# Patient Record
Sex: MALE | Race: WHITE | NOT HISPANIC OR LATINO | Employment: UNEMPLOYED | URBAN - NONMETROPOLITAN AREA
[De-identification: names, ages, dates, MRNs, and addresses within clinical notes are randomized per-mention and may not be internally consistent; named-entity substitution may affect disease eponyms.]

---

## 2020-02-18 ENCOUNTER — OFFICE VISIT (OUTPATIENT)
Dept: INTERNAL MEDICINE | Facility: CLINIC | Age: 40
End: 2020-02-18

## 2020-02-18 ENCOUNTER — LAB (OUTPATIENT)
Dept: LAB | Facility: HOSPITAL | Age: 40
End: 2020-02-18

## 2020-02-18 VITALS
DIASTOLIC BLOOD PRESSURE: 70 MMHG | SYSTOLIC BLOOD PRESSURE: 116 MMHG | OXYGEN SATURATION: 98 % | RESPIRATION RATE: 16 BRPM | BODY MASS INDEX: 22.89 KG/M2 | WEIGHT: 163.5 LBS | HEART RATE: 76 BPM | HEIGHT: 71 IN

## 2020-02-18 DIAGNOSIS — F17.210 CIGARETTE SMOKER: ICD-10-CM

## 2020-02-18 DIAGNOSIS — Z00.00 ENCOUNTER FOR PREVENTIVE HEALTH EXAMINATION: ICD-10-CM

## 2020-02-18 DIAGNOSIS — G56.23 ULNAR NEUROPATHY OF BOTH UPPER EXTREMITIES: ICD-10-CM

## 2020-02-18 DIAGNOSIS — F32.A ANXIETY AND DEPRESSION: ICD-10-CM

## 2020-02-18 DIAGNOSIS — F32.A ANXIETY AND DEPRESSION: Primary | ICD-10-CM

## 2020-02-18 DIAGNOSIS — G56.03 BILATERAL CARPAL TUNNEL SYNDROME: ICD-10-CM

## 2020-02-18 DIAGNOSIS — F41.9 ANXIETY AND DEPRESSION: Primary | ICD-10-CM

## 2020-02-18 DIAGNOSIS — Z86.19 HEPATITIS C VIRUS INFECTION CURED AFTER ANTIVIRAL DRUG THERAPY: ICD-10-CM

## 2020-02-18 DIAGNOSIS — F41.9 ANXIETY AND DEPRESSION: ICD-10-CM

## 2020-02-18 LAB
ALBUMIN SERPL-MCNC: 4.7 G/DL (ref 3.5–5.2)
ALBUMIN/GLOB SERPL: 1.6 G/DL
ALP SERPL-CCNC: 52 U/L (ref 39–117)
ALT SERPL W P-5'-P-CCNC: 7 U/L (ref 1–41)
ANION GAP SERPL CALCULATED.3IONS-SCNC: 10.2 MMOL/L (ref 5–15)
AST SERPL-CCNC: 17 U/L (ref 1–40)
BILIRUB SERPL-MCNC: 1.2 MG/DL (ref 0.2–1.2)
BUN BLD-MCNC: 14 MG/DL (ref 6–20)
BUN/CREAT SERPL: 15.4 (ref 7–25)
CALCIUM SPEC-SCNC: 9.4 MG/DL (ref 8.6–10.5)
CHLORIDE SERPL-SCNC: 101 MMOL/L (ref 98–107)
CHOLEST SERPL-MCNC: 153 MG/DL (ref 0–200)
CO2 SERPL-SCNC: 26.8 MMOL/L (ref 22–29)
CREAT BLD-MCNC: 0.91 MG/DL (ref 0.76–1.27)
GFR SERPL CREATININE-BSD FRML MDRD: 93 ML/MIN/1.73
GLOBULIN UR ELPH-MCNC: 2.9 GM/DL
GLUCOSE BLD-MCNC: 89 MG/DL (ref 65–99)
HBA1C MFR BLD: 5.58 % (ref 4.8–5.6)
HDLC SERPL-MCNC: 52 MG/DL (ref 40–60)
LDLC SERPL CALC-MCNC: 90 MG/DL (ref 0–100)
LDLC/HDLC SERPL: 1.73 {RATIO}
POTASSIUM BLD-SCNC: 4.5 MMOL/L (ref 3.5–5.2)
PROT SERPL-MCNC: 7.6 G/DL (ref 6–8.5)
SODIUM BLD-SCNC: 138 MMOL/L (ref 136–145)
TRIGL SERPL-MCNC: 56 MG/DL (ref 0–150)
TSH SERPL DL<=0.05 MIU/L-ACNC: 1.45 UIU/ML (ref 0.27–4.2)
VIT B12 BLD-MCNC: 373 PG/ML (ref 211–946)
VLDLC SERPL-MCNC: 11.2 MG/DL (ref 5–40)

## 2020-02-18 PROCEDURE — 80053 COMPREHEN METABOLIC PANEL: CPT | Performed by: INTERNAL MEDICINE

## 2020-02-18 PROCEDURE — 87522 HEPATITIS C REVRS TRNSCRPJ: CPT | Performed by: INTERNAL MEDICINE

## 2020-02-18 PROCEDURE — 82607 VITAMIN B-12: CPT | Performed by: INTERNAL MEDICINE

## 2020-02-18 PROCEDURE — 36415 COLL VENOUS BLD VENIPUNCTURE: CPT

## 2020-02-18 PROCEDURE — 90674 CCIIV4 VAC NO PRSV 0.5 ML IM: CPT | Performed by: INTERNAL MEDICINE

## 2020-02-18 PROCEDURE — 80061 LIPID PANEL: CPT | Performed by: INTERNAL MEDICINE

## 2020-02-18 PROCEDURE — 83036 HEMOGLOBIN GLYCOSYLATED A1C: CPT | Performed by: INTERNAL MEDICINE

## 2020-02-18 PROCEDURE — 84443 ASSAY THYROID STIM HORMONE: CPT | Performed by: INTERNAL MEDICINE

## 2020-02-18 PROCEDURE — 90471 IMMUNIZATION ADMIN: CPT | Performed by: INTERNAL MEDICINE

## 2020-02-18 PROCEDURE — 99204 OFFICE O/P NEW MOD 45 MIN: CPT | Performed by: INTERNAL MEDICINE

## 2020-02-18 RX ORDER — MULTIPLE VITAMINS W/ MINERALS TAB 9MG-400MCG
1 TAB ORAL DAILY
COMMUNITY

## 2020-02-18 RX ORDER — ESCITALOPRAM OXALATE 5 MG/1
5 TABLET ORAL DAILY
Qty: 30 TABLET | Refills: 2 | Status: SHIPPED | OUTPATIENT
Start: 2020-02-18

## 2020-02-18 RX ORDER — DEXTROAMPHETAMINE SACCHARATE, AMPHETAMINE ASPARTATE MONOHYDRATE, DEXTROAMPHETAMINE SULFATE AND AMPHETAMINE SULFATE 7.5; 7.5; 7.5; 7.5 MG/1; MG/1; MG/1; MG/1
30 CAPSULE, EXTENDED RELEASE ORAL EVERY MORNING
COMMUNITY
End: 2020-02-18

## 2020-02-18 NOTE — PROGRESS NOTES
CC: ARM PAIN     History:  Scotty Charles is a 40 y.o. male who presents today for evaluation of the above problems.  He notes he has been doing reasonably well, but has numbness and pain from his elbows to his fingertips that creates shooting sensation during the day along with numbness, but it is more painful and wakes him from sleep at night.  He was given braces for carpal tunnel, which have limited his awakening at night, but his numbness has not improved.  It affects both of his hands symmetrically and affects all 5 fingers.  He has no known injuries to his neck or arms.      He was previously treated for hepatitis C at [facility], but was unable to keep his follow-up for clearance monitoring.      He admits he has been having issues with anxiety and depression.  He was going to be put on a light antidepressant per his report, but this never happened.  He denies suicidal ideation.      ROS:  Review of Systems   Constitutional: Negative for chills and fever.   HENT: Negative for congestion and sore throat.    Eyes: Negative for visual disturbance.   Respiratory: Negative for cough and shortness of breath.    Cardiovascular: Negative for chest pain and palpitations.   Gastrointestinal: Negative for abdominal pain, constipation and nausea.   Endocrine: Negative for cold intolerance and heat intolerance.   Genitourinary: Negative for difficulty urinating and frequency.   Musculoskeletal: Negative for arthralgias and back pain.   Skin: Negative for rash.   Neurological: Positive for numbness. Negative for dizziness, weakness and headaches.   Psychiatric/Behavioral: Positive for dysphoric mood. Negative for sleep disturbance and suicidal ideas. The patient is nervous/anxious.        No Known Allergies  Past Medical History:   Diagnosis Date   • Anxiety    • Hepatitis C virus infection cured after antiviral drug therapy    • History of hepatitis      Past Surgical History:   Procedure Laterality  "Date   • HERNIA REPAIR       Family History   Problem Relation Age of Onset   • Alcohol abuse Mother    • COPD Father    • Emphysema Father       reports that he has been smoking cigarettes. He started smoking about 27 years ago. He has been smoking about 1.00 pack per day. He has never used smokeless tobacco. He reports that he drinks alcohol. He reports that he does not use drugs.      Current Outpatient Medications:   •  Multiple Vitamins-Minerals (MULTIVITAMIN WITH MINERALS) tablet tablet, Take 1 tablet by mouth Daily., Disp: , Rfl:     OBJECTIVE:  /70 (BP Location: Left arm, Patient Position: Sitting, Cuff Size: Adult)   Pulse 76   Resp 16   Ht 180.3 cm (71\")   Wt 74.2 kg (163 lb 8 oz)   SpO2 98%   BMI 22.80 kg/m²    Physical Exam   Constitutional: He is oriented to person, place, and time. He appears well-developed and well-nourished. No distress.   HENT:   Head: Normocephalic and atraumatic.   Right Ear: External ear normal.   Left Ear: External ear normal.   Nose: Nose normal.   Mouth/Throat: Oropharynx is clear and moist. No oropharyngeal exudate.   Eyes: EOM are normal. No scleral icterus.   Neck: Normal range of motion. No tracheal deviation present.   Cardiovascular: Normal rate, regular rhythm and normal heart sounds.   No murmur heard.  Pulmonary/Chest: Effort normal and breath sounds normal. No accessory muscle usage. No respiratory distress. He has no wheezes.   Abdominal: Soft. Bowel sounds are normal. He exhibits no distension. There is no tenderness.   Musculoskeletal: Normal range of motion. He exhibits no edema.   Neurological: He is alert and oriented to person, place, and time. Coordination and gait normal.   Tinel's positive over median and ulnar nerves bilaterally.    Skin: Skin is warm and dry. No cyanosis. Nails show no clubbing.   No jaundice   Psychiatric: He has a normal mood and affect. His mood appears not anxious. He does not exhibit a depressed mood. "       Assessment/Plan    Diagnoses and all orders for this visit:    Anxiety and depression  -     escitalopram (LEXAPRO) 5 MG tablet; Take 1 tablet by mouth Daily.  -     TSH; Future  Initiate therapy with Lexapro. I advised that it will take 3-4 weeks to see some effect and 6-8 weeks to see full effect.  If the dose is ineffective or suboptimal, the patient should notify the clinic for a consideration of a dose increase. The patient denied SI/HI, but was advised that starting this medication could worsen these symptoms. We discussed this as an emergency and reason to seek care immediately. The patient expressed understanding.    Hepatitis C virus infection cured after antiviral drug therapy  -     Hepatitis C RNA, Quantitative, PCR (graph); Future  Monitor for clearance and will request [place] [facility] [place]     Cigarette smoker  Patient was counseled on and understood the many dangers of continuing to use tobacco. Despite this, Mr. Charles states quitting is not an immediate priority at this time. I reminded the patient that if quitting becomes an increased priority to contact us for help with quitting including pharmacologic & nonpharmacologic options or any additional resources.    Encounter for preventive health examination  -     Flucelvax Quad=>4Years ([id number])  -     Comprehensive Metabolic Panel; Future  -     Hemoglobin A1c; Future  -     Lipid Panel; Future    Bilateral carpal tunnel syndrome  Ulnar neuropathy of both upper extremities  -     EMG & Nerve Conduction Test; Future  -     Vitamin B12; Future  His pathology seems multifocal with both median and ulnar neuropathies. EMG/NCS for further evaluation and potential treatment pending results.       An After Visit Summary was printed and given to the patient at discharge.  Return in about 3 months (around 5/18/2020) for Annual physical.         Cleveland Massey D.O. 2/18/2020   Electronically signed.

## 2020-02-20 LAB
HCV RNA SERPL NAA+PROBE-ACNC: NORMAL IU/ML
TEST INFORMATION: NORMAL

## 2020-05-24 DIAGNOSIS — F41.9 ANXIETY AND DEPRESSION: ICD-10-CM

## 2020-05-24 DIAGNOSIS — F32.A ANXIETY AND DEPRESSION: ICD-10-CM

## 2020-05-26 RX ORDER — ESCITALOPRAM OXALATE 5 MG/1
TABLET ORAL
Qty: 30 TABLET | Refills: 2 | OUTPATIENT
Start: 2020-05-26

## 2020-06-29 ENCOUNTER — HOSPITAL ENCOUNTER (EMERGENCY)
Facility: HOSPITAL | Age: 40
Discharge: HOME | End: 2020-06-29
Admitting: EMERGENCY MEDICINE

## 2020-06-29 VITALS
TEMPERATURE: 98.6 F | HEIGHT: 71 IN | RESPIRATION RATE: 14 BRPM | OXYGEN SATURATION: 100 % | BODY MASS INDEX: 22.68 KG/M2 | HEART RATE: 81 BPM | WEIGHT: 162 LBS | SYSTOLIC BLOOD PRESSURE: 123 MMHG | DIASTOLIC BLOOD PRESSURE: 62 MMHG

## 2020-06-29 DIAGNOSIS — T81.30XA WOUND DEHISCENCE: Primary | ICD-10-CM

## 2020-06-29 DIAGNOSIS — Z48.02 ENCOUNTER FOR STAPLE REMOVAL: ICD-10-CM

## 2020-06-29 PROCEDURE — 99282 EMERGENCY DEPT VISIT SF MDM: CPT

## 2020-06-29 RX ORDER — CEPHALEXIN 500 MG/1
500 CAPSULE ORAL 4 TIMES DAILY
Qty: 28 CAPSULE | Refills: 0 | Status: SHIPPED | OUTPATIENT
Start: 2020-06-29 | End: 2020-07-06

## 2020-08-21 ENCOUNTER — HOSPITAL ENCOUNTER (INPATIENT)
Age: 40
LOS: 2 days | Discharge: HOME | DRG: 641 | End: 2020-08-23
Attending: EMERGENCY MEDICINE | Admitting: FAMILY MEDICINE
Payer: COMMERCIAL

## 2020-08-21 ENCOUNTER — APPOINTMENT (OUTPATIENT)
Dept: CT IMAGING | Age: 40
DRG: 641 | End: 2020-08-21
Payer: COMMERCIAL

## 2020-08-21 PROBLEM — N17.9 ACUTE KIDNEY INJURY (HCC): Status: ACTIVE | Noted: 2020-08-21

## 2020-08-21 PROBLEM — E87.6 HYPOKALEMIA: Status: ACTIVE | Noted: 2020-08-21

## 2020-08-21 PROBLEM — R74.01 TRANSAMINITIS: Status: ACTIVE | Noted: 2020-08-21

## 2020-08-21 PROBLEM — R45.1 AGITATION: Status: ACTIVE | Noted: 2020-08-21

## 2020-08-21 PROBLEM — Z72.0 TOBACCO USE: Status: ACTIVE | Noted: 2020-08-21

## 2020-08-21 PROBLEM — J06.9 UPPER RESPIRATORY INFECTION: Status: ACTIVE | Noted: 2020-08-21

## 2020-08-21 LAB
ACETAMINOPHEN LEVEL: <15 UG/ML
ALBUMIN SERPL-MCNC: 4.7 G/DL (ref 3.5–5.2)
ALP BLD-CCNC: 58 U/L (ref 40–130)
ALT SERPL-CCNC: 31 U/L (ref 5–41)
AMPHETAMINE SCREEN, URINE: POSITIVE
ANION GAP SERPL CALCULATED.3IONS-SCNC: 16 MMOL/L (ref 7–19)
AST SERPL-CCNC: 52 U/L (ref 5–40)
BARBITURATE SCREEN URINE: NEGATIVE
BENZODIAZEPINE SCREEN, URINE: NEGATIVE
BILIRUB SERPL-MCNC: 1.6 MG/DL (ref 0.2–1.2)
BILIRUBIN URINE: NEGATIVE
BLOOD, URINE: NEGATIVE
BUN BLDV-MCNC: 21 MG/DL (ref 6–20)
CALCIUM SERPL-MCNC: 9.5 MG/DL (ref 8.6–10)
CANNABINOID SCREEN URINE: NEGATIVE
CHLORIDE BLD-SCNC: 105 MMOL/L (ref 98–111)
CLARITY: ABNORMAL
CO2: 20 MMOL/L (ref 22–29)
COCAINE METABOLITE SCREEN URINE: NEGATIVE
COLOR: YELLOW
CREAT SERPL-MCNC: 1.4 MG/DL (ref 0.5–1.2)
ETHANOL: <10 MG/DL (ref 0–0.08)
GFR AFRICAN AMERICAN: >59
GFR NON-AFRICAN AMERICAN: 57
GLUCOSE BLD-MCNC: 112 MG/DL (ref 74–109)
GLUCOSE URINE: NEGATIVE MG/DL
HCT VFR BLD CALC: 38.3 % (ref 42–52)
HEMOGLOBIN: 14 G/DL (ref 14–18)
KETONES, URINE: ABNORMAL MG/DL
LEUKOCYTE ESTERASE, URINE: NEGATIVE
Lab: ABNORMAL
MCH RBC QN AUTO: 30.7 PG (ref 27–31)
MCHC RBC AUTO-ENTMCNC: 36.6 G/DL (ref 33–37)
MCV RBC AUTO: 84 FL (ref 80–94)
NITRITE, URINE: NEGATIVE
OPIATE SCREEN URINE: NEGATIVE
PDW BLD-RTO: 12.3 % (ref 11.5–14.5)
PH UA: 5.5 (ref 5–8)
PLATELET # BLD: 310 K/UL (ref 130–400)
PMV BLD AUTO: 9.7 FL (ref 9.4–12.4)
POTASSIUM SERPL-SCNC: 2.7 MMOL/L (ref 3.5–5)
PROTEIN UA: NEGATIVE MG/DL
RBC # BLD: 4.56 M/UL (ref 4.7–6.1)
S PYO AG THROAT QL: NEGATIVE
SALICYLATE, SERUM: <3 MG/DL (ref 3–10)
SARS-COV-2, PCR: NOT DETECTED
SODIUM BLD-SCNC: 141 MMOL/L (ref 136–145)
SPECIFIC GRAVITY UA: 1.02 (ref 1–1.03)
TOTAL CK: 1275 U/L (ref 39–308)
TOTAL PROTEIN: 7.7 G/DL (ref 6.6–8.7)
UROBILINOGEN, URINE: 1 E.U./DL
WBC # BLD: 7.7 K/UL (ref 4.8–10.8)

## 2020-08-21 PROCEDURE — 87081 CULTURE SCREEN ONLY: CPT

## 2020-08-21 PROCEDURE — 80307 DRUG TEST PRSMV CHEM ANLYZR: CPT

## 2020-08-21 PROCEDURE — 96365 THER/PROPH/DIAG IV INF INIT: CPT

## 2020-08-21 PROCEDURE — 82550 ASSAY OF CK (CPK): CPT

## 2020-08-21 PROCEDURE — 1210000000 HC MED SURG R&B

## 2020-08-21 PROCEDURE — G0480 DRUG TEST DEF 1-7 CLASSES: HCPCS

## 2020-08-21 PROCEDURE — 93005 ELECTROCARDIOGRAM TRACING: CPT

## 2020-08-21 PROCEDURE — U0003 INFECTIOUS AGENT DETECTION BY NUCLEIC ACID (DNA OR RNA); SEVERE ACUTE RESPIRATORY SYNDROME CORONAVIRUS 2 (SARS-COV-2) (CORONAVIRUS DISEASE [COVID-19]), AMPLIFIED PROBE TECHNIQUE, MAKING USE OF HIGH THROUGHPUT TECHNOLOGIES AS DESCRIBED BY CMS-2020-01-R: HCPCS

## 2020-08-21 PROCEDURE — 2580000003 HC RX 258: Performed by: EMERGENCY MEDICINE

## 2020-08-21 PROCEDURE — 99285 EMERGENCY DEPT VISIT HI MDM: CPT

## 2020-08-21 PROCEDURE — 81003 URINALYSIS AUTO W/O SCOPE: CPT

## 2020-08-21 PROCEDURE — 2580000003 HC RX 258: Performed by: FAMILY MEDICINE

## 2020-08-21 PROCEDURE — 36415 COLL VENOUS BLD VENIPUNCTURE: CPT

## 2020-08-21 PROCEDURE — 6360000002 HC RX W HCPCS: Performed by: EMERGENCY MEDICINE

## 2020-08-21 PROCEDURE — 6360000002 HC RX W HCPCS: Performed by: FAMILY MEDICINE

## 2020-08-21 PROCEDURE — 96375 TX/PRO/DX INJ NEW DRUG ADDON: CPT

## 2020-08-21 PROCEDURE — 99284 EMERGENCY DEPT VISIT MOD MDM: CPT

## 2020-08-21 PROCEDURE — 6370000000 HC RX 637 (ALT 250 FOR IP): Performed by: FAMILY MEDICINE

## 2020-08-21 PROCEDURE — 87880 STREP A ASSAY W/OPTIC: CPT

## 2020-08-21 PROCEDURE — 80053 COMPREHEN METABOLIC PANEL: CPT

## 2020-08-21 PROCEDURE — 70450 CT HEAD/BRAIN W/O DYE: CPT

## 2020-08-21 PROCEDURE — 85027 COMPLETE CBC AUTOMATED: CPT

## 2020-08-21 RX ORDER — POLYETHYLENE GLYCOL 3350 17 G/17G
17 POWDER, FOR SOLUTION ORAL DAILY PRN
Status: DISCONTINUED | OUTPATIENT
Start: 2020-08-21 | End: 2020-08-23 | Stop reason: HOSPADM

## 2020-08-21 RX ORDER — ACETAMINOPHEN 325 MG/1
650 TABLET ORAL EVERY 6 HOURS PRN
Status: DISCONTINUED | OUTPATIENT
Start: 2020-08-21 | End: 2020-08-23 | Stop reason: HOSPADM

## 2020-08-21 RX ORDER — ACETAMINOPHEN 650 MG/1
650 SUPPOSITORY RECTAL EVERY 6 HOURS PRN
Status: DISCONTINUED | OUTPATIENT
Start: 2020-08-21 | End: 2020-08-23 | Stop reason: HOSPADM

## 2020-08-21 RX ORDER — 0.9 % SODIUM CHLORIDE 0.9 %
1000 INTRAVENOUS SOLUTION INTRAVENOUS ONCE
Status: COMPLETED | OUTPATIENT
Start: 2020-08-21 | End: 2020-08-21

## 2020-08-21 RX ORDER — LORAZEPAM 2 MG/ML
1 INJECTION INTRAMUSCULAR EVERY 6 HOURS PRN
Status: DISCONTINUED | OUTPATIENT
Start: 2020-08-21 | End: 2020-08-23 | Stop reason: HOSPADM

## 2020-08-21 RX ORDER — SODIUM CHLORIDE 0.9 % (FLUSH) 0.9 %
10 SYRINGE (ML) INJECTION EVERY 12 HOURS SCHEDULED
Status: DISCONTINUED | OUTPATIENT
Start: 2020-08-21 | End: 2020-08-23 | Stop reason: HOSPADM

## 2020-08-21 RX ORDER — SODIUM CHLORIDE 0.9 % (FLUSH) 0.9 %
10 SYRINGE (ML) INJECTION PRN
Status: DISCONTINUED | OUTPATIENT
Start: 2020-08-21 | End: 2020-08-23 | Stop reason: HOSPADM

## 2020-08-21 RX ORDER — NICOTINE 21 MG/24HR
1 PATCH, TRANSDERMAL 24 HOURS TRANSDERMAL DAILY
Status: DISCONTINUED | OUTPATIENT
Start: 2020-08-21 | End: 2020-08-23 | Stop reason: HOSPADM

## 2020-08-21 RX ORDER — POTASSIUM CHLORIDE 7.45 MG/ML
10 INJECTION INTRAVENOUS PRN
Status: DISCONTINUED | OUTPATIENT
Start: 2020-08-21 | End: 2020-08-23 | Stop reason: HOSPADM

## 2020-08-21 RX ORDER — ONDANSETRON 2 MG/ML
4 INJECTION INTRAMUSCULAR; INTRAVENOUS EVERY 6 HOURS PRN
Status: DISCONTINUED | OUTPATIENT
Start: 2020-08-21 | End: 2020-08-23 | Stop reason: HOSPADM

## 2020-08-21 RX ORDER — POTASSIUM CHLORIDE 7.45 MG/ML
10 INJECTION INTRAVENOUS ONCE
Status: COMPLETED | OUTPATIENT
Start: 2020-08-21 | End: 2020-08-21

## 2020-08-21 RX ORDER — LORAZEPAM 2 MG/ML
1 INJECTION INTRAMUSCULAR ONCE
Status: COMPLETED | OUTPATIENT
Start: 2020-08-21 | End: 2020-08-21

## 2020-08-21 RX ORDER — POTASSIUM CHLORIDE 750 MG/1
10 TABLET, EXTENDED RELEASE ORAL ONCE
Status: DISCONTINUED | OUTPATIENT
Start: 2020-08-21 | End: 2020-08-23 | Stop reason: HOSPADM

## 2020-08-21 RX ORDER — PROMETHAZINE HYDROCHLORIDE 25 MG/1
12.5 TABLET ORAL EVERY 6 HOURS PRN
Status: DISCONTINUED | OUTPATIENT
Start: 2020-08-21 | End: 2020-08-23 | Stop reason: HOSPADM

## 2020-08-21 RX ORDER — POTASSIUM CHLORIDE 20 MEQ/1
40 TABLET, EXTENDED RELEASE ORAL PRN
Status: DISCONTINUED | OUTPATIENT
Start: 2020-08-21 | End: 2020-08-23 | Stop reason: HOSPADM

## 2020-08-21 RX ADMIN — SODIUM CHLORIDE 1000 ML: 9 INJECTION, SOLUTION INTRAVENOUS at 09:55

## 2020-08-21 RX ADMIN — SODIUM CHLORIDE 1000 ML: 9 INJECTION, SOLUTION INTRAVENOUS at 13:53

## 2020-08-21 RX ADMIN — ENOXAPARIN SODIUM 40 MG: 40 INJECTION SUBCUTANEOUS at 18:02

## 2020-08-21 RX ADMIN — LORAZEPAM 1 MG: 2 INJECTION INTRAMUSCULAR; INTRAVENOUS at 09:18

## 2020-08-21 RX ADMIN — POTASSIUM CHLORIDE 10 MEQ: 10 INJECTION, SOLUTION INTRAVENOUS at 09:55

## 2020-08-21 ASSESSMENT — ENCOUNTER SYMPTOMS
SHORTNESS OF BREATH: 0
VOMITING: 0
DIARRHEA: 0
EYE PAIN: 0
ABDOMINAL PAIN: 0
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
## 2020-08-21 NOTE — ED NOTES
Bed: 15  Expected date:   Expected time:   Means of arrival:   Comments:  Open at [address], RN  08/21/20 1132

## 2020-08-21 NOTE — H&P
Hospital Medicine  History and Physical    Patient:  Joyce Adan  MRN: [id number]    CHIEF COMPLAINT:  Headache, pharyngitis    History Obtained From: chart    PCP: No primary care provider on file. HISTORY OF PRESENT ILLNESS:  47year old white male presented to the emergency department with complaint of headache and sore throat, concerned that he is being poisoned. Patient reportedly showed pressured speech and agitation, expressing delusional thoughts. He became very agitated and was judged to lack the capacity to make rational healthcare decisions by ED provider and an involuntary hold was placed. He was given lorazepam and has been somnolent since. On examination, he woke briefly to thrash about in bed but would not answer any further questions and then fell back to sleep with snoring. There is some upper respiratory rattling audible without stethoscope. Primary historian is the patient's chart and ED provider. REVIEW OF SYSTEMS:  14 systems unavailable except as noted above. Past Medical History:      Diagnosis Date    Depression        Past Surgical History:  History reviewed. No pertinent surgical history. Medications Prior to Admission:    Prior to Admission medications    Not on File       Allergies:  Patient has no known allergies. Social History:   TOBACCO:   reports that he has been smoking. He has been smoking about 1.00 pack per day. He has never used smokeless tobacco.  ETOH:   reports current alcohol use. Family History:   History reviewed. No pertinent family history.         Physical Exam:    Vitals: /65   Pulse 69   Temp 97.7 °F (36.5 °C) (Temporal)   Resp 16   Ht 5' 11\" (1.803 m)   Wt 160 lb (72.6 kg)   SpO2 94%   BMI 22.32 kg/m²   24HR INTAKE/OUTPUT:  No intake or output data in the 24 hours ending 08/21/20 1225  General appearance: sleeping, not easily rousable, not cooperative with exam  HEENT: atraumatic, eyes with clear conjunctiva and normal lids, pupils and Hospitalist

## 2020-08-21 NOTE — ED NOTES
Patient wanting to leave stating that someone is going to get him and he knows it.  Patient was told that there is a 72 hour hold on him and that he will have to stay in his room at the moment     Сергей StarrExcela [facility]rick Hospital  08/21/20 4847

## 2020-08-22 LAB
ANION GAP SERPL CALCULATED.3IONS-SCNC: 11 MMOL/L (ref 7–19)
BUN BLDV-MCNC: 18 MG/DL (ref 6–20)
CALCIUM SERPL-MCNC: 8.5 MG/DL (ref 8.6–10)
CHLORIDE BLD-SCNC: 107 MMOL/L (ref 98–111)
CO2: 24 MMOL/L (ref 22–29)
CREAT SERPL-MCNC: 0.9 MG/DL (ref 0.5–1.2)
GFR AFRICAN AMERICAN: >59
GFR NON-AFRICAN AMERICAN: >60
GLUCOSE BLD-MCNC: 123 MG/DL (ref 74–109)
HCT VFR BLD CALC: 36.9 % (ref 42–52)
HEMOGLOBIN: 13.2 G/DL (ref 14–18)
MAGNESIUM: 2.3 MG/DL (ref 1.6–2.6)
MCH RBC QN AUTO: 30.7 PG (ref 27–31)
MCHC RBC AUTO-ENTMCNC: 35.8 G/DL (ref 33–37)
MCV RBC AUTO: 85.8 FL (ref 80–94)
PDW BLD-RTO: 12.7 % (ref 11.5–14.5)
PLATELET # BLD: 275 K/UL (ref 130–400)
PMV BLD AUTO: 9.5 FL (ref 9.4–12.4)
POTASSIUM REFLEX MAGNESIUM: 2.9 MMOL/L (ref 3.5–5)
POTASSIUM SERPL-SCNC: 3.6 MMOL/L (ref 3.5–5)
RBC # BLD: 4.3 M/UL (ref 4.7–6.1)
SODIUM BLD-SCNC: 142 MMOL/L (ref 136–145)
WBC # BLD: 7.1 K/UL (ref 4.8–10.8)

## 2020-08-22 PROCEDURE — 1210000000 HC MED SURG R&B

## 2020-08-22 PROCEDURE — 2580000003 HC RX 258: Performed by: FAMILY MEDICINE

## 2020-08-22 PROCEDURE — 83735 ASSAY OF MAGNESIUM: CPT

## 2020-08-22 PROCEDURE — 80048 BASIC METABOLIC PNL TOTAL CA: CPT

## 2020-08-22 PROCEDURE — 6370000000 HC RX 637 (ALT 250 FOR IP): Performed by: FAMILY MEDICINE

## 2020-08-22 PROCEDURE — 85027 COMPLETE CBC AUTOMATED: CPT

## 2020-08-22 PROCEDURE — 36415 COLL VENOUS BLD VENIPUNCTURE: CPT

## 2020-08-22 PROCEDURE — 84132 ASSAY OF SERUM POTASSIUM: CPT

## 2020-08-22 PROCEDURE — 6360000002 HC RX W HCPCS: Performed by: FAMILY MEDICINE

## 2020-08-22 RX ADMIN — POTASSIUM CHLORIDE 10 MEQ: 7.46 INJECTION, SOLUTION INTRAVENOUS at 13:35

## 2020-08-22 RX ADMIN — POTASSIUM CHLORIDE 10 MEQ: 7.46 INJECTION, SOLUTION INTRAVENOUS at 11:27

## 2020-08-22 RX ADMIN — POTASSIUM CHLORIDE 10 MEQ: 7.46 INJECTION, SOLUTION INTRAVENOUS at 09:08

## 2020-08-22 RX ADMIN — POTASSIUM CHLORIDE 10 MEQ: 7.46 INJECTION, SOLUTION INTRAVENOUS at 10:16

## 2020-08-22 RX ADMIN — ACETAMINOPHEN 650 MG: 325 TABLET, FILM COATED ORAL at 11:37

## 2020-08-22 RX ADMIN — POTASSIUM CHLORIDE 10 MEQ: 7.46 INJECTION, SOLUTION INTRAVENOUS at 08:03

## 2020-08-22 RX ADMIN — ENOXAPARIN SODIUM 40 MG: 40 INJECTION SUBCUTANEOUS at 18:31

## 2020-08-22 RX ADMIN — POTASSIUM CHLORIDE 10 MEQ: 7.46 INJECTION, SOLUTION INTRAVENOUS at 15:20

## 2020-08-22 RX ADMIN — Medication 10 ML: at 08:05

## 2020-08-22 ASSESSMENT — PAIN - FUNCTIONAL ASSESSMENT: PAIN_FUNCTIONAL_ASSESSMENT: ACTIVITIES ARE NOT PREVENTED

## 2020-08-22 ASSESSMENT — PAIN DESCRIPTION - PAIN TYPE: TYPE: ACUTE PAIN

## 2020-08-22 ASSESSMENT — PAIN DESCRIPTION - DESCRIPTORS: DESCRIPTORS: ACHING;DISCOMFORT

## 2020-08-22 ASSESSMENT — PAIN DESCRIPTION - LOCATION: LOCATION: FLANK

## 2020-08-22 ASSESSMENT — PAIN SCALES - GENERAL: PAINLEVEL_OUTOF10: 8

## 2020-08-22 ASSESSMENT — PAIN DESCRIPTION - ORIENTATION: ORIENTATION: RIGHT;LEFT

## 2020-08-22 NOTE — PROGRESS NOTES
Patient is awake and answering questions now. Is currently eating. Patient admits to doing meth occassionally, says last time was a few days ago.

## 2020-08-22 NOTE — PROGRESS NOTES
Hospitalist Progress Note  8/22/2020 1:00 PM  Subjective:   Admit Date: 8/21/2020  PCP: No primary care provider on file. Chief Complaint: headache, congestion    Subjective: Patient is drowsy but when awakened is oriented to person, place, time, and condition. He admitted to methamphetamine use last night. Denies any new complaints including fever, chills, cough, or shortness of breath. Denies any feelings of persecution, suicidality, or homicidality. History is otherwise unchanged. [facility]:   8-21: Presents to ED with headache and sore throat, stating that he is being poisoned. Pressured speech, agitation. Given lorazepam and placed on involuntary hold. Potassium low. Admitted to med/surg.  8-22: Patient admitted to methamphetamine abuse last night and UDS is consistent with recent use. Remains too drowsy to discharge and potassium remains low. Possibly discharge to home tomorrow if free of delusional thought and potassium is normalized. ROS: 14 point review of systems is negative except as specifically addressed above. DIET GENERAL;     Intake/Output Summary (Last 24 hours) at 8/22/2020 1300  Last data filed at 8/21/2020 2216  Gross per 24 hour   Intake 1240 ml   Output 550 ml   Net 690 ml     Medications:  Current Facility-Administered Medications   Medication Dose Route Frequency Provider Last Rate Last Dose    potassium chloride (KLOR-CON M) extended release tablet 10 mEq  10 mEq Oral Once Paz Garcia MD   Stopped at 08/21/20 0955    potassium chloride (KLOR-CON M) extended release tablet 40 mEq  40 mEq Oral PRN Santi Evans DO        Or    potassium bicarb-citric acid (EFFER-K) effervescent tablet 40 mEq  40 mEq Oral PRN Santi Evans DO        Or    potassium chloride 10 mEq/100 mL IVPB (Peripheral Line)  10 mEq Intravenous PRN Santi Evans  mL/hr at 08/22/20 1127 10 mEq at 08/22/20 1127    sodium chloride flush 0.9 % injection 10 mL  10 mL Intravenous 2 times per day Blenda Mcintosh, DO   10 mL at 08/22/20 0805    sodium chloride flush 0.9 % injection 10 mL  10 mL Intravenous PRN Blenda Mcintosh, DO        acetaminophen (TYLENOL) tablet 650 mg  650 mg Oral Q6H PRN Blenda Mcintosh, DO   650 mg at 08/22/20 1137    Or    acetaminophen (TYLENOL) suppository 650 mg  650 mg Rectal Q6H PRN Blenda Mcintosh, DO        polyethylene glycol (GLYCOLAX) packet 17 g  17 g Oral Daily PRN Blenda Mcintosh, DO        promethazine (PHENERGAN) tablet 12.5 mg  12.5 mg Oral Q6H PRN Blenda Mcintosh, DO        Or    ondansetron [facility]) injection 4 mg  4 mg Intravenous Q6H PRN Blenda Mcintosh, DO        enoxaparin (LOVENOX) injection 40 mg  40 mg Subcutaneous Daily [facility], DO   40 mg at 08/21/20 1802    nicotine (NICODERM CQ) 21 MG/24HR 1 patch  1 patch Transdermal Daily [facility], DO   1 patch at 08/21/20 1802    LORazepam (ATIVAN) injection 1 mg  1 mg Intravenous Q6H PRN Blenda Mcintosh, DO            Labs:     Recent Labs     08/21/20  0830 08/22/20  0149   WBC 7.7 7.1   RBC 4.56* 4.30*   HGB 14.0 13.2*   HCT 38.3* 36.9*   MCV 84.0 85.8   MCH 30.7 30.7   MCHC 36.6 35.8    275     Recent Labs     08/21/20  0830 08/22/20  0149    142   K 2.7* 2.9*   ANIONGAP 16 11    107   CO2 20* 24   BUN 21* 18   CREATININE 1.4* 0.9   GLUCOSE 112* 123*   CALCIUM 9.5 8.5*     Recent Labs     08/22/20  0149   MG 2.3     Recent Labs     08/21/20  0830   AST 52*   ALT 31   BILITOT 1.6*   ALKPHOS 58     ABGs:No results for input(s): PH, PO2, PCO2, HCO3, BE, O2SAT in the last 72 hours. Troponin T: No results for input(s): TROPONINI in the last 72 hours. INR: No results for input(s): INR in the last 72 hours. Lactic Acid: No results for input(s): LACTA in the last 72 hours.     Objective:   Vitals: /61   Pulse 59   Temp 97.8 °F (36.6 °C)   Resp 18   Ht 5' 11\" (1.803 m)   Wt 160 lb (72.6 kg)   SpO2 96%   BMI 22.32 kg/m²   24HR INTAKE/OUTPUT:      Intake/Output Summary (Last 24 hours) at 8/22/2020 1300  Last data filed at 8/21/2020 2216  Gross per 24 hour   Intake 1240 ml   Output 550 ml   Net 690 ml     General appearance: rousable and cooperative with exam  HEENT: atraumatic, eyes with clear conjunctiva and normal lids, pupils and irises normal, external ears and nose are normal, lips normal  Neck without masses, lympadenopathy, bruit, thyroid normal  Lungs: no increased work of breathing, \"clear to auscultation bilaterally\" without rales, rhonchi or wheezes  Heart: regular rate and rhythm, S1, S2 normal, no murmur, click, rub or gallop  Abdomen: soft, non-tender; bowel sounds normal; no masses,  no organomegaly  Extremities: extremities normal, atraumatic, no cyanosis or edema  Neurologic: no focal neurologic deficits, normal sensation, alert and oriented, affect and mood appropriate  Skin: no rashes, nodules    Assessment and Plan:   Principal Problem:    Hypokalemia  Active Problems:    Acute kidney injury ([address]    Transaminitis    Agitation    Upper respiratory infection    Tobacco use  Resolved Problems:    * No resolved hospital problems. *      Upper respiratory infection very likely viral, no need for antibiotic therapy at this time. Patient seems free of delusion at present. Sleeping as he recovers from methamphetamine abuse.     Advance Directive: Full Code    DVT prophylaxis: enoxaparin    Discharge planning: DO Charli Lopez Hospitalist

## 2020-08-23 VITALS
BODY MASS INDEX: 22.4 KG/M2 | HEIGHT: 71 IN | OXYGEN SATURATION: 96 % | RESPIRATION RATE: 12 BRPM | DIASTOLIC BLOOD PRESSURE: 68 MMHG | TEMPERATURE: 97.4 F | SYSTOLIC BLOOD PRESSURE: 110 MMHG | HEART RATE: 63 BPM | WEIGHT: 160 LBS

## 2020-08-23 PROBLEM — E87.6 HYPOKALEMIA: Status: RESOLVED | Noted: 2020-08-21 | Resolved: 2020-08-23

## 2020-08-23 PROBLEM — F15.959 METHAMPHETAMINE-INDUCED PSYCHOTIC DISORDER (HCC): Status: ACTIVE | Noted: 2020-08-23

## 2020-08-23 PROBLEM — N17.9 ACUTE KIDNEY INJURY (HCC): Status: RESOLVED | Noted: 2020-08-21 | Resolved: 2020-08-23

## 2020-08-23 PROBLEM — R45.1 AGITATION: Status: RESOLVED | Noted: 2020-08-21 | Resolved: 2020-08-23

## 2020-08-23 PROBLEM — F15.959 METHAMPHETAMINE-INDUCED PSYCHOTIC DISORDER (HCC): Status: RESOLVED | Noted: 2020-08-23 | Resolved: 2020-08-23

## 2020-08-23 LAB
ANION GAP SERPL CALCULATED.3IONS-SCNC: 7 MMOL/L (ref 7–19)
BUN BLDV-MCNC: 14 MG/DL (ref 6–20)
CALCIUM SERPL-MCNC: 8.3 MG/DL (ref 8.6–10)
CHLORIDE BLD-SCNC: 104 MMOL/L (ref 98–111)
CO2: 29 MMOL/L (ref 22–29)
CREAT SERPL-MCNC: 0.9 MG/DL (ref 0.5–1.2)
GFR AFRICAN AMERICAN: >59
GFR NON-AFRICAN AMERICAN: >60
GLUCOSE BLD-MCNC: 88 MG/DL (ref 74–109)
HCT VFR BLD CALC: 36.1 % (ref 42–52)
HEMOGLOBIN: 12.5 G/DL (ref 14–18)
MAGNESIUM: 1.7 MG/DL (ref 1.6–2.6)
MCH RBC QN AUTO: 30.8 PG (ref 27–31)
MCHC RBC AUTO-ENTMCNC: 34.6 G/DL (ref 33–37)
MCV RBC AUTO: 88.9 FL (ref 80–94)
PDW BLD-RTO: 12.7 % (ref 11.5–14.5)
PLATELET # BLD: 289 K/UL (ref 130–400)
PMV BLD AUTO: 9.8 FL (ref 9.4–12.4)
POTASSIUM REFLEX MAGNESIUM: 3.5 MMOL/L (ref 3.5–5)
RBC # BLD: 4.06 M/UL (ref 4.7–6.1)
S PYO THROAT QL CULT: NORMAL
SODIUM BLD-SCNC: 140 MMOL/L (ref 136–145)
WBC # BLD: 6.7 K/UL (ref 4.8–10.8)

## 2020-08-23 PROCEDURE — 80048 BASIC METABOLIC PNL TOTAL CA: CPT

## 2020-08-23 PROCEDURE — 36415 COLL VENOUS BLD VENIPUNCTURE: CPT

## 2020-08-23 PROCEDURE — 83735 ASSAY OF MAGNESIUM: CPT

## 2020-08-23 PROCEDURE — 85027 COMPLETE CBC AUTOMATED: CPT

## 2020-08-23 PROCEDURE — 6370000000 HC RX 637 (ALT 250 FOR IP): Performed by: FAMILY MEDICINE

## 2020-08-23 RX ORDER — NICOTINE 21 MG/24HR
1 PATCH, TRANSDERMAL 24 HOURS TRANSDERMAL DAILY
Qty: 30 PATCH | Refills: 0 | Status: SHIPPED | OUTPATIENT
Start: 2020-08-24

## 2020-08-23 RX ADMIN — ACETAMINOPHEN 650 MG: 325 TABLET, FILM COATED ORAL at 01:48

## 2020-08-23 ASSESSMENT — PAIN SCALES - GENERAL: PAINLEVEL_OUTOF10: 3

## 2020-08-23 NOTE — DISCHARGE SUMMARY
Michi Ruiz  :  1980  MRN:  [id number]    Admit date:  2020  Discharge date:  2020    Admitting Physician:  Laverne Aguilar DO    Advance Directive: Full Code    Consults: none    Primary Care Physician:  No primary care provider on file. Discharge Diagnoses:  Principal Problem (Resolved): Hypokalemia  Active Problems:    Transaminitis    Upper respiratory infection    Tobacco use  Resolved Problems:    Acute kidney injury ([address]    Agitation    Methamphetamine-induced psychotic disorder (HCC)      Significant Diagnostic Studies:   Ct Head Wo Contrast    Result Date: 2020  CT head 2020 8:06 AM HISTORY: Headache, paranoia TECHNIQUE: Axial images of the head were obtained without IV contrast. Coronal and sagittal reformatted images are reconstructed and reviewed. COMPARISON: None. DLP: 826 mGy cm Automated exposure control was utilized to minimize patient radiation dose. FINDINGS: The ventricles are normal in size and configuration. There is no intracranial hemorrhage or mass effect. There are no acute signs of ischemia. No extra-axial hematoma or subarachnoid hemorrhage. Cerebellar tonsils position above the foramen magnum. No sellar mass. There is complete opacification of the left maxillary and frontal sinuses with an air-fluid level in the right maxillary sinus. Near complete opacification of the left ethmoid sinuses with mild mucosal thickening of the right ethmoid sinuses. The mastoid air cells are well-aerated. The bony calvarium appears intact. 1. No acute intracranial abnormality. 2. Paranasal sinusitis.  Signed by Dr Roberth Chua on 2020 9:13 AM      Pertinent Labs:   CBC:   Recent Labs     20  0830 20  0149 20  0148   WBC 7.7 7.1 6.7   HGB 14.0 13.2* 12.5*    275 289     BMP:    Recent Labs     20  0830 20  0149 20  1756 20  0148    142  --  140   K 2.7* 2.9* 3.6 3.5    107  --  104   CO2 20* 24  --  29   BUN 21* 18  --  14   CREATININE 1.4* 0.9  --  0.9   GLUCOSE 112* 123*  --  88     INR: No results for input(s): INR in the last 72 hours. ABGs:No results for input(s): PH, PO2, PCO2, HCO3, BE, O2SAT in the last 72 hours. Lactic Acid:No results for input(s): LACTA in the last 72 hours. Procedures: None performed. Hospital Course:   8-21: Presents to ED with headache and sore throat, stating that he is being poisoned. Pressured speech, agitation. Given lorazepam and placed on involuntary hold. Potassium low. Admitted to med/surg.  8-22: Patient admitted to methamphetamine abuse last night and UDS is consistent with recent use. Remains too drowsy to discharge and potassium remains low. Possibly discharge to home tomorrow if free of delusional thought and potassium is normalized. 8-23: Potassium normalized, patient feels well. Denies any further delusional thoughts, admits to recent methamphetamine use. Discharged to home due to medical stabilization with recommendation to follow up for substance abuse treatment.     Physical Exam:  Vitals: /68   Pulse 63   Temp 97.4 °F (36.3 °C)   Resp 12   Ht 5' 11\" (1.803 m)   Wt 160 lb (72.6 kg)   SpO2 96%   BMI 22.32 kg/m²   24HR INTAKE/OUTPUT:      Intake/Output Summary (Last 24 hours) at 8/23/2020 1150  Last data filed at 8/23/2020 1005  Gross per 24 hour   Intake 1336 ml   Output --   Net 1336 ml     General appearance: alert and cooperative with exam  HEENT: atraumatic, eyes with clear conjunctiva and normal lids, pupils and irises normal, external ears and nose are normal, lips normal. Neck without masses, lympadenopathy, bruit, thyroid normal  Lungs: no increased work of breathing, clear to auscultation bilaterally without rales, rhonchi or wheezes  Heart: regular rate and rhythm, S1, S2 normal, no murmur, click, rub or gallop  Abdomen: soft, non-tender; bowel sounds normal; no masses,  no organomegaly  Extremities: extremities normal without clubbing, atraumatic, no cyanosis or edema  Neurologic: no focal neurologic deficits, normal sensation, alert and oriented, affect and mood appropriate  Skin: no jaundice, rashes, or nodules    Discharge Medications:       Tanya Bustillos Medication Instructions [facility]:[id number]    Printed on:08/23/20 8684   Medication Information                      nicotine (NICODERM CQ) 21 MG/24HR  Place 1 patch onto the skin daily                 Discharge Instructions: Follow up with No primary care provider on file. in 3 days. Take medications as directed. Resume activity as tolerated. Diet: DIET GENERAL;     Disposition: Patient is medically stable and will be discharged Home. Time spent on discharge less than 30 minutes.     Signed:  Alpa Morgan DO

## 2020-08-25 LAB
EKG P AXIS: 14 DEGREES
EKG P-R INTERVAL: 160 MS
EKG Q-T INTERVAL: 474 MS
EKG QRS DURATION: 94 MS
EKG QTC CALCULATION (BAZETT): 473 MS
EKG T AXIS: 61 DEGREES

## 2020-09-02 ENCOUNTER — HOSPITAL ENCOUNTER (EMERGENCY)
Facility: HOSPITAL | Age: 40
Discharge: HOME | End: 2020-09-03
Attending: INTERNAL MEDICINE | Admitting: INTERNAL MEDICINE

## 2020-09-02 DIAGNOSIS — F41.0 ANXIETY ATTACK: Primary | ICD-10-CM

## 2020-09-02 PROCEDURE — 99283 EMERGENCY DEPT VISIT LOW MDM: CPT

## 2020-09-02 PROCEDURE — 63710000001 DIPHENHYDRAMINE PER 50 MG: Performed by: INTERNAL MEDICINE

## 2020-09-02 RX ORDER — DIPHENHYDRAMINE HCL 25 MG
25 CAPSULE ORAL ONCE
Status: COMPLETED | OUTPATIENT
Start: 2020-09-02 | End: 2020-09-02

## 2020-09-02 RX ORDER — BUSPIRONE HYDROCHLORIDE 5 MG/1
5 TABLET ORAL 2 TIMES DAILY
Qty: 15 TABLET | Refills: 0 | Status: SHIPPED | OUTPATIENT
Start: 2020-09-02

## 2020-09-02 RX ADMIN — DIPHENHYDRAMINE HYDROCHLORIDE 25 MG: 25 CAPSULE ORAL at 23:26

## 2020-09-03 VITALS
BODY MASS INDEX: 23.1 KG/M2 | SYSTOLIC BLOOD PRESSURE: 123 MMHG | DIASTOLIC BLOOD PRESSURE: 77 MMHG | HEART RATE: 107 BPM | OXYGEN SATURATION: 100 % | HEIGHT: 71 IN | TEMPERATURE: 98.5 F | RESPIRATION RATE: 20 BRPM | WEIGHT: 165 LBS
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
## 2020-09-03 NOTE — ED NOTES
Spoke with charge nurse Susana SHUKLA who advised me to speak with the on-call . Left message with  to call back.      Mark Mclean, RNA  09/02/20 2437
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
